# Patient Record
Sex: FEMALE | Race: WHITE | URBAN - METROPOLITAN AREA
[De-identification: names, ages, dates, MRNs, and addresses within clinical notes are randomized per-mention and may not be internally consistent; named-entity substitution may affect disease eponyms.]

---

## 2018-03-22 ENCOUNTER — NURSE TRIAGE (OUTPATIENT)
Dept: NURSING | Facility: CLINIC | Age: 23
End: 2018-03-22

## 2018-03-22 NOTE — TELEPHONE ENCOUNTER
Reason for Disposition    New-onset wheezing, but mild and all triage questions negative    Additional Information    Negative: Wheezing and life-threatening allergic reaction to similar substance in the past    Negative: Wheezing starts suddenly after allergic food, new medicine or bee sting    Negative: Severe difficulty breathing (struggling for each breath, unable to speak or cry, making grunting noises with each breath, severe retractions) (Triage tip: Listen to the child's breathing.)    Negative: Passed out    Negative: Bluish lips, tongue or face now    Negative: Sounds like a life-threatening emergency to the triager    Negative: Bronchiolitis or RSV diagnosed in last 2 weeks    Negative: Previous diagnosis of asthma (or RAD) OR regular use of asthma medicines for wheezing    Negative: [1] Age < 2 years AND [2] given albuterol inhaler or neb for home treatment within the last 2 weeks BUT [3] no diagnosis given and no history of regular use of asthma meds    Negative: [1] Age > 2 years AND [2] given albuterol inhaler or neb for home treatment within the last 2 weeks BUT [3] no diagnosis given    Negative: Doesn't fit the description of wheezing    Negative: Severe wheezing (e.g., wheezing can be heard across the room)    Negative: Choked on small object or food recently    Negative: [1] Age < 12 weeks AND [2] fever 100.4 F (38.0 C) or higher rectally    Negative: Age < 6 months old with wheezing    Negative: [1] Difficulty breathing (> 1 year old) AND [2] not severe (Triage tip: Listen to the child's breathing.)    Negative: [1] Difficulty breathing (< 1 year old) AND [2] not severe AND [3] not relieved by cleaning out the nose (Triage tip: Listen to the child's breathing.)    Negative: Ribs are pulling in with each breath (retractions)    Negative: [1] Lips or face have turned bluish BUT [2] not present now    Negative: Rapid breathing (Breaths/min > 60 if < 2 mo;  > 50 if 2-12 mo;  > 40 if 1-5 years old;  "> 30 if 6-12 years; and > 20 if > 12 years old)    Negative: [1] Drinking very little AND [2] signs of dehydration (no urine > 12 hours, very dry mouth, no tears, etc.)    Negative: [1] Fever AND [2] > 105 F (40.6 C) by any route OR axillary > 104 F (40 C)    Negative: [1] Fever AND [2] weak immune system (sickle cell disease, HIV, splenectomy, chemotherapy, organ transplant, chronic oral steroids, etc)    Negative: High-risk child (e.g., underlying heart, lung or severe neuromuscular disease)    Negative: Age < 1 year old AND [2] history of prematurity (< 37 weeks) or NICU stay    Negative: Child sounds very sick or weak to the triager    Negative: [1] Age< 1 year AND [2] refuses to breast or bottle feed for 2 or more feedings    Negative: [1] Has albuterol (or other bronchodilator treatment) at home and [2] wants to use it for mild wheezing AND [3] diagnosis unknown    Answer Assessment - Initial Assessment Questions  Note to Triager - Respiratory Distress: Always rule out respiratory distress (also known as working hard to breathe or shortness of breath). Listen for grunting, stridor, wheezing, tachypnea in these calls. How to assess: Listen to the child's breathing early in your assessment. Reason: What you hear is often more valid than the caller's answers to your triage questions.  1. ONSET: \"When did the wheezing begin?\"       Few weeks ago, thinks her dad's house has black mold so she is staying at mom's house. Also smokes and is exposed to second hand smoke.  2. RESPIRATORY STATUS: \"Describe your child's breathing. What does it sound like?\" (e.g., wheezing, stridor, grunting, weak cry, unable to speak, retractions, rapid rate, cyanosis)      Occasional wheeze, speaking now in full sentences  3. FEEDING STATUS:  \"Is your child having difficulty with breast or bottle feeding?\"  If so, ask:  \"How long can he feed without stopping to take a breath?\"      ok  4. ASSOCIATED VIRAL INFECTION: \"Does your child also " "have a cold, cough or fever?\"       no  5. ASSOCIATED ALLERGIES: \"Does your child also have any allergies?\"       ?  6. RECURRENT EPISODES: \"Has your child had other attacks of wheezing?\" If so, ask: \"When was the last time?\" and \"What happened that time?\"       no  7. FAMILY HISTORY: \"Does anyone in your family have asthma?\"       no  8. CHILD'S APPEARANCE: \"How sick is your child acting?\" \" What is he doing right now?\" If asleep, ask: \"How was he acting before he went to sleep?\"      Ok, is at work now    Protocols used: WHEEZING - OTHER THAN ASTHMA-PEDIATRIC-AH    "